# Patient Record
Sex: FEMALE | Race: OTHER
[De-identification: names, ages, dates, MRNs, and addresses within clinical notes are randomized per-mention and may not be internally consistent; named-entity substitution may affect disease eponyms.]

---

## 2019-02-26 ENCOUNTER — HOSPITAL ENCOUNTER (EMERGENCY)
Dept: HOSPITAL 62 - ER | Age: 24
LOS: 1 days | Discharge: HOME | End: 2019-02-27
Payer: OTHER GOVERNMENT

## 2019-02-26 DIAGNOSIS — R10.9: ICD-10-CM

## 2019-02-26 DIAGNOSIS — N39.0: Primary | ICD-10-CM

## 2019-02-26 DIAGNOSIS — R11.0: ICD-10-CM

## 2019-02-26 PROCEDURE — 87088 URINE BACTERIA CULTURE: CPT

## 2019-02-26 PROCEDURE — 87086 URINE CULTURE/COLONY COUNT: CPT

## 2019-02-26 PROCEDURE — 99283 EMERGENCY DEPT VISIT LOW MDM: CPT

## 2019-02-26 PROCEDURE — 81001 URINALYSIS AUTO W/SCOPE: CPT

## 2019-02-26 PROCEDURE — S0119 ONDANSETRON 4 MG: HCPCS

## 2019-02-26 PROCEDURE — 87186 SC STD MICRODIL/AGAR DIL: CPT

## 2019-02-26 PROCEDURE — 96372 THER/PROPH/DIAG INJ SC/IM: CPT

## 2019-02-27 VITALS — SYSTOLIC BLOOD PRESSURE: 120 MMHG | DIASTOLIC BLOOD PRESSURE: 73 MMHG

## 2019-02-27 LAB
APPEARANCE UR: (no result)
APTT PPP: (no result) S
BILIRUB UR QL STRIP: NEGATIVE
GLUCOSE UR STRIP-MCNC: NEGATIVE MG/DL
KETONES UR STRIP-MCNC: (no result) MG/DL
NITRITE UR QL STRIP: POSITIVE
PH UR STRIP: 7 [PH] (ref 5–9)
PROT UR STRIP-MCNC: 100 MG/DL
SP GR UR STRIP: 1.02
UROBILINOGEN UR-MCNC: 4 MG/DL (ref ?–2)

## 2019-02-27 NOTE — ER DOCUMENT REPORT
HPI





- HPI


Patient complains to provider of: UTI


Time Seen by Provider: 02/26/19 23:46


Onset: This morning


Onset/Duration: Waxing and waning


Quality of pain: Burning


Pain Level: 5


Context: 





Patient presents complaining of hematuria dysuria and frequency that started 

today with nausea.  Patient denies any back pain or fever.  Patient denies any 

vaginal bleeding or discharge.


Associated Symptoms: Nausea.  denies: Fever, Vomiting


Exacerbated by: Denies


Relieved by: Denies


Similar symptoms previously: Yes


Recently seen / treated by doctor: No





- ROS


ROS below otherwise negative: Yes


Systems Reviewed and Negative: Yes All other systems reviewed and negative





- CONSTITUTIONAL


Constitutional: DENIES: Fever, Chills





- NEURO


Neurology: DENIES: Headache





- GASTROINTESTINAL


Gastrointestinal: REPORTS: Abdominal Pain, Nausea.  DENIES: Patient vomiting





- URINARY


Urinary: REPORTS: Dysuria, Frequency





- REPRODUCTIVE


Reproductive: DENIES: Pregnant:, Abnormal bleeding / discharge





- MUSCULOSKELETAL


Musculoskeletal: DENIES: Back Pain





- DERM


Skin Color: Normal


Skin Problems: None





Past Medical History





- General


Information source: Patient





- Social History


Smoking Status: Never Smoker


Frequency of alcohol use: None


Drug Abuse: None


Occupation: [employer]


Family History: Reviewed & Not Pertinent





- Medical History


Medical History: Negative


Renal/ Medical History: Denies: Hx Peritoneal Dialysis


Surgical Hx: Negative





Vertical Provider Document





- CONSTITUTIONAL


Agree With Documented VS: Yes


Exam Limitations: No Limitations


General Appearance: WD/WN, No Apparent Distress





- INFECTION CONTROL


TRAVEL OUTSIDE OF THE [place] IN LAST 30 DAYS: No





- HEENT


HEENT: Atraumatic, Normocephalic





- NECK


Neck: Normal Inspection, Supple.  negative: Lymphadenopathy-Left, 

Lymphadenopathy-Right





- RESPIRATORY


Respiratory: Breath Sounds Normal, No Respiratory Distress





- CARDIOVASCULAR


Cardiovascular: Regular Rate, Regular Rhythm, No Murmur





- GI/ABDOMEN


Gastrointestinal: Abdomen Soft, Abdomen Tender - Suprapubic, Normal Bowel Sounds





- BACK


Back: Normal Inspection.  negative: CVA Tenderness-Right, CVA Tenderness-Left





- MUSCULOSKELETAL/EXTREMETIES


Musculoskeletal/Extremeties: MAEW, FROM





- NEURO


Level of Consciousness: Awake, Alert, Appropriate


Motor/Sensory: No Motor Deficit





- DERM


Integumentary: Warm, Dry, No Rash





Course





- Re-evaluation


Re-evalutation: 





02/27/19 00:46


Patient with UTI, no concern for pyelonephritis or obstructive uropathy.  

Patient reports nausea is improved at this time after the medication.  Discussed

worsening symptoms of patient should return immediately for.  Patient verbalized

understanding agrees with plan of care.





- Vital Signs


Vital signs: 


                                        











Temp Pulse Resp BP Pulse Ox


 


 98.1 F   86   18   117/70   99 


 


 02/26/19 22:58  02/26/19 22:58  02/26/19 22:58  02/26/19 22:58  02/26/19 22:58














- Laboratory


Laboratory results interpreted by me: 





02/27/19 00:46


                               Labs- Entire Visit











  02/27/19





  00:01


 


Urine Color  ROBINSON


 


Urine Appearance  CLOUDY


 


Urine pH  7.0


 


Ur Specific Gravity  1.024


 


Urine Protein  100 H


 


Urine Glucose (UA)  NEGATIVE


 


Urine Ketones  TRACE H


 


Urine Blood  LARGE H


 


Urine Nitrite  POSITIVE H


 


Urine Bilirubin  NEGATIVE


 


Urine Urobilinogen  4.0 H


 


Ur Leukocyte Esterase  SMALL H


 


Urine WBC (Auto)  127


 


Urine RBC (Auto)  >182


 


Urine Bacteria (Auto)  1+


 


Squamous Epi Cells Auto  3


 


U Non-Squamous Epis Auto  3


 


Urine Mucus (Auto)  FEW


 


Urine Ascorbic Acid  NEGATIVE














Discharge





- Discharge


Clinical Impression: 


 Nausea





UTI (urinary tract infection)


Qualifiers:


 Urinary tract infection type: site unspecified Hematuria presence: with 

hematuria Qualified Code(s): N39.0 - Urinary tract infection, site not specified





Condition: Stable


Disposition: HOME, SELF-CARE


Instructions:  Nausea or Vomiting, Nonspecific ([facility]), Urinary Anesthetic Agent 

([facility]), Urinary Tract Infection ([facility])


Additional Instructions: 


Return immediately for any new or worsening symptoms





Followup with your primary care provider, call tomorrow to make a followup 

appointment








Prescriptions: 


Cephalexin Monohydrate [Keflex 500 mg Capsule] 500 mg PO Q6H 7 Days  capsule


Ondansetron HCl [Zofran 4 mg Tablet] 1 - 2 tab PO Q6 PRN #10 tablet


 PRN Reason: 


Phenazopyridine HCl [Pyridium 200 mg Tablet] 200 mg PO TID #15 tablet


Referrals: 


ANUJ PRICE [Primary Care Provider] - Follow up as needed


[facility] [Provider Group] - Follow up as needed

## 2021-01-11 ENCOUNTER — HOSPITAL ENCOUNTER (EMERGENCY)
Dept: HOSPITAL 62 - ER | Age: 26
Discharge: HOME | End: 2021-01-11
Payer: COMMERCIAL

## 2021-01-11 VITALS — DIASTOLIC BLOOD PRESSURE: 65 MMHG | SYSTOLIC BLOOD PRESSURE: 108 MMHG

## 2021-01-11 DIAGNOSIS — J06.9: Primary | ICD-10-CM

## 2021-01-11 DIAGNOSIS — M79.10: ICD-10-CM

## 2021-01-11 DIAGNOSIS — R05: ICD-10-CM

## 2021-01-11 DIAGNOSIS — Z79.3: ICD-10-CM

## 2021-01-11 DIAGNOSIS — R11.0: ICD-10-CM

## 2021-01-11 DIAGNOSIS — J02.9: ICD-10-CM

## 2021-01-11 DIAGNOSIS — Z20.822: ICD-10-CM

## 2021-01-11 DIAGNOSIS — J34.89: ICD-10-CM

## 2021-01-11 DIAGNOSIS — R51.9: ICD-10-CM

## 2021-01-11 LAB
A TYPE INFLUENZA AG: NEGATIVE
B INFLUENZA AG: NEGATIVE

## 2021-01-11 PROCEDURE — 87635 SARS-COV-2 COVID-19 AMP PRB: CPT

## 2021-01-11 PROCEDURE — C9803 HOPD COVID-19 SPEC COLLECT: HCPCS

## 2021-01-11 PROCEDURE — 99283 EMERGENCY DEPT VISIT LOW MDM: CPT

## 2021-01-11 PROCEDURE — S0119 ONDANSETRON 4 MG: HCPCS

## 2021-01-11 PROCEDURE — 87804 INFLUENZA ASSAY W/OPTIC: CPT

## 2021-01-11 NOTE — ER DOCUMENT REPORT
ED Flu Like





- General


Chief Complaint: Flu Symptoms


Stated Complaint: RUNNY NOSE, NAUSEA, COUGH, HEADACHE,POSSIBLE FEVER


Time Seen by Provider: 21 06:09


Primary Care Provider: 


ALIE HAIR MD [Primary Care Provider] - Follow up as needed


Mode of Arrival: Ambulatory


Information source: Patient


Notes: 





21 03:03 - ED Nursing Note by YADIGHASSAN


   Acct Num: [id number]  : 1995  Patient Age: 25





Didn't feel well on Friday. Began to feel better on Saturday,  night woke 

up feeling nauseous with body aches. Patient took mucinex and tylenol and went 

to sleep. Woke up not feeling well but felt better by afternoon. Patient has 

nausea and has taken dayquil today. Reports cough, headache, and runny nose. VS 

stable no acute distress at this time. 


MY NOTES


25-year-old female with chief complaint of 5 days of myalgias sore throat cough 

productive yellow phlegm and rhinorrhea.  She also has some mild headache.  She 

reports her son just got over a URI and was diagnosed with a virus not Covid and

not influenza.  Patient is in the [employer] and needs a note for work and 

needs a note to rule out Covid and influenza.  These are pending at this time 

for Covid and influenza is negative.


TRAVEL OUTSIDE OF THE [place] IN LAST 30 DAYS: No





- HPI


Onset: Just prior to arrival


Timing/Duration: Constant, Better


Quality of pain: Achy


Severity: Mild


Pain Level: 1


CO exposure: No


Shortness of breath: Mild


Associated symptoms: Body/muscle aches, Productive cough, Headache, Sinus 

pain/drainage.  denies: Earache, Hoarseness, Hurts to breath, Leg swelling, 

Nausea, Vomiting, Shortness of breath, Slow to respond, Sore throat, Sweating, 

Weakness





- Related Data


Allergies/Adverse Reactions: 


                                        





No Known Allergies Allergy (Unverified 19 05:40)


   








Home Medications: birth control





Past Medical History





- General


Information source: Patient





- Social History


Smoking Status: Never Smoker


Cigarette use (# per day): No


Chew tobacco use (# tins/day): No


Smoking Education Provided: No


Frequency of alcohol use: None


Drug Abuse: None


Family History: Reviewed & Not Pertinent


Patient has suicidal ideation: No


Patient has homicidal ideation: No


Renal/ Medical History: Denies: Hx Peritoneal Dialysis





Review of Systems





- Review of Systems


Constitutional: No symptoms reported


EENT: No symptoms reported, Nose congestion, Nose discharge.  denies: Eye pain, 

Eye discharge, Blurred vision, Tearing, Double vision, Ear pain, Ear discharge, 

Nose pain, Sinus pressure, Sinus discharge, Throat pain, Difficulty swallowing, 

Throat swelling, Mouth pain, Mouth swelling, Dental problem


Cardiovascular: No symptoms reported


Respiratory: See HPI, Cough


Gastrointestinal: No symptoms reported


Genitourinary: No symptoms reported


Female Genitourinary: No symptoms reported


Musculoskeletal: No symptoms reported


Skin: No symptoms reported


Hematologic/Lymphatic: No symptoms reported


Neurological/Psychological: No symptoms reported


-: Yes All other systems reviewed and negative





Physical Exam





- Vital signs


Vitals: 


                                        











Temp Pulse Resp BP Pulse Ox


 


 98.1 F   97   17   123/83   100 


 


 21 02:54  21 02:54  21 02:54  21 02:54  21 02:54











Interpretation: Normal





- General


General appearance: Appears well, Alert





- HEENT


Head: Normocephalic, Atraumatic


Eyes: Normal


Pupils: PERRL


Ears: Normal


Sinus: Normal


Nasal: Normal


Mucous membranes: Normal


Pharynx: Other - +1 tonsils noninflamed no erythema no injection


Neck: Normal





- Respiratory


Respiratory status: No respiratory distress


Chest status: Nontender


Breath sounds: Normal


Chest palpation: Normal





- Cardiovascular


Rhythm: Regular


Heart sounds: Normal auscultation


Murmur: No





- Abdominal


Inspection: Normal


Distension: No distension


Bowel sounds: Normal


Tenderness: Nontender


Organomegaly: No organomegaly





- Rectal


Hemorrhoids: Other - Deferred





- Genitourinary


Bimanuel exam: Other - Deferred





- Back


Back: Normal, Nontender





- Extremities


General upper extremity: Normal inspection, Nontender, Normal color, Normal ROM,

Normal temperature


General lower extremity: Normal inspection, Nontender, Normal color, Normal ROM,

Normal temperature, Normal weight bearing.  No: Meredith's sign





- Neurological


Neuro grossly intact: Yes


Cognition: Normal


Orientation: AAOx4


[place] Coma Scale Eye Opening: Spontaneous


[place] Coma Scale Verbal: Oriented


[place] Coma Scale Motor: Obeys Commands


[place] Coma Scale Total: 15


Speech: Normal


Motor strength normal: LUE, RUE, LLE, RLE


Sensory: Normal





- Psychological


Associated symptoms: Normal affect, Normal mood





- Skin


Skin Temperature: Warm


Skin Moisture: Dry


Skin Color: Normal





Course





- Vital Signs


Vital signs: 


                                        











Temp Pulse Resp BP Pulse Ox


 


 98.1 F   97   17   123/83   100 


 


 21 02:54  21 02:54  21 02:54  21 02:54  21 02:54














- Laboratory Results


Critical Laboratory Results Reviewed: Yes


Attending or Supervising Physician who Reviewed Labs: AMI CALI JR





- Radiology Results


Critical Radiology Results Reviewed: No Critical Results


Attending or Supervising Physician who Reviewed Radiology: AMI CALI JR





Discharge





- Discharge


Clinical Impression: 


URI (upper respiratory infection)


Qualifiers:


 URI type: unspecified URI Qualified Code(s): J06.9 - Acute upper respiratory 

infection, unspecified





Condition: Stable


Disposition: HOME, SELF-CARE


Additional Instructions: 


Off work as directed iollow-up with personal doctor return to ER as needed take 

medicines as directed encourage fluids


Prescriptions: 


Dexamethasone [Decadron 4 Mg Tablet] 4 mg PO DAILY 3 Days #3 tablet


Azithromycin [Zithromax 250 mg Tablet] 250 mg PO ASDIR PRN #6 tablet


 PRN Reason: 


Forms:  Return to Work


Referrals: 


ALIE HAIR MD [Primary Care Provider] - Follow up as needed